# Patient Record
Sex: FEMALE | Race: WHITE | Employment: FULL TIME | URBAN - NONMETROPOLITAN AREA
[De-identification: names, ages, dates, MRNs, and addresses within clinical notes are randomized per-mention and may not be internally consistent; named-entity substitution may affect disease eponyms.]

---

## 2021-07-16 ENCOUNTER — HOSPITAL ENCOUNTER (EMERGENCY)
Age: 51
Discharge: HOME | End: 2021-07-16
Payer: COMMERCIAL

## 2021-07-16 VITALS
OXYGEN SATURATION: 98 % | WEIGHT: 164 LBS | TEMPERATURE: 97.5 F | SYSTOLIC BLOOD PRESSURE: 116 MMHG | DIASTOLIC BLOOD PRESSURE: 70 MMHG | BODY MASS INDEX: 28.6 KG/M2 | RESPIRATION RATE: 16 BRPM | HEART RATE: 73 BPM

## 2021-07-16 DIAGNOSIS — L03.115 CELLULITIS OF RIGHT FOOT: Primary | ICD-10-CM

## 2021-07-16 PROCEDURE — 99202 OFFICE O/P NEW SF 15 MIN: CPT | Performed by: NURSE PRACTITIONER

## 2021-07-16 PROCEDURE — 99203 OFFICE O/P NEW LOW 30 MIN: CPT

## 2021-07-16 RX ORDER — CEPHALEXIN 250 MG/1
250 CAPSULE ORAL 4 TIMES DAILY
Qty: 20 CAPSULE | Refills: 0 | Status: SHIPPED | OUTPATIENT
Start: 2021-07-16 | End: 2021-07-21

## 2021-07-16 RX ORDER — PANTOPRAZOLE SODIUM 40 MG/10ML
40 INJECTION, POWDER, LYOPHILIZED, FOR SOLUTION INTRAVENOUS DAILY
COMMUNITY

## 2021-07-16 ASSESSMENT — PAIN DESCRIPTION - LOCATION: LOCATION: FOOT

## 2021-07-16 ASSESSMENT — PAIN DESCRIPTION - ORIENTATION: ORIENTATION: RIGHT

## 2021-07-16 ASSESSMENT — ENCOUNTER SYMPTOMS
CHEST TIGHTNESS: 0
WHEEZING: 0
CHOKING: 0
COUGH: 0
APNEA: 0
SHORTNESS OF BREATH: 0
STRIDOR: 0
BACK PAIN: 0

## 2021-07-16 ASSESSMENT — PAIN SCALES - GENERAL: PAINLEVEL_OUTOF10: 3

## 2021-07-16 ASSESSMENT — PAIN DESCRIPTION - ONSET: ONSET: SUDDEN

## 2021-07-16 ASSESSMENT — PAIN DESCRIPTION - PAIN TYPE: TYPE: ACUTE PAIN

## 2021-07-16 ASSESSMENT — PAIN - FUNCTIONAL ASSESSMENT: PAIN_FUNCTIONAL_ASSESSMENT: PREVENTS OR INTERFERES SOME ACTIVE ACTIVITIES AND ADLS

## 2021-07-16 ASSESSMENT — PAIN DESCRIPTION - DESCRIPTORS: DESCRIPTORS: SHARP

## 2021-07-16 ASSESSMENT — PAIN DESCRIPTION - PROGRESSION: CLINICAL_PROGRESSION: GRADUALLY WORSENING

## 2021-07-16 ASSESSMENT — PAIN DESCRIPTION - FREQUENCY: FREQUENCY: CONTINUOUS

## 2021-07-16 NOTE — ED NOTES
Discharge instructions reviewed with patient. Patient informed to go to ER for worsening symptoms and to follow up with the Residency clinic. Contact info given. Patient ambulatory out in stable condition.       Pete Lawson RN  07/16/21 7253

## 2021-07-16 NOTE — ED TRIAGE NOTES
Pt to room 1 with c/o right heal pain that happened suddenly when she got out of bed this AM. She denies any injury but does states she pulled a piece of callus off of the bottom of her foot in that area a few days ago. She is wondering if it is cellulitis.

## 2021-07-16 NOTE — ED PROVIDER NOTES
[place] [id number]  Urgent Care Encounter      CHIEF COMPLAINT       Chief Complaint   Patient presents with    Foot Pain     right heal pain, no known injury       Nurses Notes reviewed and I agree except as noted in the HPI. HISTORY OFPRESENT ILLNESS   Ann Jaimes is a 45 y.o. The history is provided by the patient. No  was used. Foot Problem  Location:  Foot  Time since incident:  1 day  Injury: no    Foot location:  Sole of R foot  Pain details:     Quality:  Aching and shooting    Radiates to: into ankle area. Severity:  Severe    Onset quality:  Sudden    Duration:  12 hours    Timing:  Constant    Progression:  Unchanged  Chronicity:  New  Dislocation: no    Foreign body present:  No foreign bodies  Tetanus status:  Unknown  Prior injury to area:  No  Relieved by:  Nothing  Worsened by:  Bearing weight  Ineffective treatments:  None tried  Associated symptoms: stiffness    Associated symptoms: no back pain, no decreased ROM, no fatigue, no fever, no itching, no muscle weakness, no neck pain, no numbness, no swelling and no tingling    Risk factors: no concern for non-accidental trauma, no frequent fractures, no known bone disorder, no obesity and no recent illness    Risk factors comment:  Osteopenia      REVIEW OF SYSTEMS     Review of Systems   Constitutional: Negative for activity change, appetite change, chills, diaphoresis, fatigue, fever and unexpected weight change. Respiratory: Negative for apnea, cough, choking, chest tightness, shortness of breath, wheezing and stridor. Cardiovascular: Negative for chest pain, palpitations and leg swelling. Musculoskeletal: Positive for gait problem, myalgias and stiffness. Negative for back pain and neck pain. Skin: Positive for wound. Negative for itching.        PAST MEDICAL HISTORY         Diagnosis Date    Fibromyalgia     Osteopenia        SURGICAL HISTORY     Patient  has a past surgical history that includes Cholecystectomy and Hysterectomy. CURRENT MEDICATIONS       Previous Medications    CYMBALTA 60 MG CAPSULE    TAKE ONE CAPSULE BY MOUTH EVERY DAY    ESTROGENS, CONJUGATED, (PREMARIN) 0.9 MG TABLET    Take 1 tablet by mouth daily. LEVOTHYROXINE SODIUM 50 MCG CAPS    Take 50 mcg by mouth daily. PANTOPRAZOLE (PROTONIX) 40 MG INJECTION    Infuse 40 mg intravenously daily       ALLERGIES     Patient is is allergic to bactrim [sulfamethoxazole-trimethoprim] and pcn [penicillins]. FAMILY HISTORY     Patient's family history is not on file. SOCIAL HISTORY     Patient  reports that she has never smoked. She has never used smokeless tobacco. She reports that she does not drink alcohol and does not use drugs. PHYSICAL EXAM     ED TRIAGE VITALS  BP: 116/70, Temp: 97.5 °F (36.4 °C), Pulse: 73, Resp: 16, SpO2: 98 %  Physical Exam  Vitals and nursing note reviewed. Constitutional:       General: She is not in acute distress. Appearance: Normal appearance. She is not ill-appearing, toxic-appearing or diaphoretic. HENT:      Head: Normocephalic and atraumatic. Right Ear: External ear normal.      Left Ear: External ear normal.   Eyes:      Extraocular Movements: Extraocular movements intact. Conjunctiva/sclera: Conjunctivae normal.   Cardiovascular:      Pulses: Normal pulses. Pulmonary:      Effort: Pulmonary effort is normal.   Musculoskeletal:      Cervical back: Normal range of motion. Left foot: Decreased range of motion. Feet:    Feet:      Left foot:      Skin integrity: Erythema, warmth, dry skin and fissure present. No ulcer, blister, skin breakdown or callus. Comments: Augusta Rolon declined x-ray at facility today  Neurological:      General: No focal deficit present. Mental Status: She is alert and oriented to person, place, and time. Psychiatric:         Mood and Affect: Mood normal.         Behavior: Behavior normal.         Thought Content:  Thought content normal.         Judgment: Judgment normal.         DIAGNOSTIC RESULTS   Labs:No results found for this visit on 07/16/21. IMAGING:  No orders to display     URGENT CARE COURSE:     Vitals:    07/16/21 1732   BP: 116/70   Pulse: 73   Resp: 16   Temp: 97.5 °F (36.4 °C)   TempSrc: Temporal   SpO2: 98%   Weight: 164 lb (74.4 kg)       Medications - No data to display  PROCEDURES:  None  FINAL IMPRESSION      1. Cellulitis of right foot        DISPOSITION/PLAN   Decision To Discharge     The patient/Patient representative was advised to rest at home and elevate the affected area. Patient/representative was also advised to apply warm compresses to the affected area up to 15/20 minutes at a time up to 4 times a day. Patient/Patient representative is also advised to monitor any changes such as increasing redness, pain, development of fever or chills, generalized body aches. The patient will be given medication and is advised to take as directed. If the patient develops any changes such as fever not relieved with Motrin and Tylenol chest pain or shortness of breath patient is to dial 911 or go directly to the emergency room for reevaluation and further management of care. The patient does not experience any of these symptoms or concerns. Follow-up with her primary care provider in 2-3 days for reevaluation. The patient/Patient representatives are agreeable to treatment plan at this time and the patient left in stable condition.           PATIENT REFERRED TO:  . [address] [place] [id number]  Call today  [phone]    DISCHARGE MEDICATIONS:  New Prescriptions    CEPHALEXIN (KEFLEX) 250 MG CAPSULE    Take 1 capsule by mouth 4 times daily for 5 days     Current Discharge Medication List          MORALES Mcnair - MORALES Canales CNP  07/16/21 9431

## 2021-09-11 ENCOUNTER — APPOINTMENT (OUTPATIENT)
Dept: GENERAL RADIOLOGY | Age: 51
End: 2021-09-11
Payer: COMMERCIAL

## 2021-09-11 ENCOUNTER — HOSPITAL ENCOUNTER (EMERGENCY)
Age: 51
Discharge: HOME | End: 2021-09-11
Attending: EMERGENCY MEDICINE
Payer: COMMERCIAL

## 2021-09-11 VITALS
HEART RATE: 78 BPM | OXYGEN SATURATION: 93 % | DIASTOLIC BLOOD PRESSURE: 64 MMHG | SYSTOLIC BLOOD PRESSURE: 102 MMHG | TEMPERATURE: 97.8 F
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
## 2021-09-11 PROCEDURE — 2500000003 HC RX 250 WO HCPCS

## 2021-09-11 PROCEDURE — 99284 EMERGENCY DEPT VISIT MOD MDM: CPT

## 2021-09-11 PROCEDURE — 6370000000 HC RX 637 (ALT 250 FOR IP): Performed by: STUDENT IN AN ORGANIZED HEALTH CARE EDUCATION/TRAINING PROGRAM

## 2021-09-11 PROCEDURE — 13152 CMPLX RPR E/N/E/L 2.6-7.5 CM: CPT | Performed by: OTOLARYNGOLOGY

## 2021-09-11 PROCEDURE — 73130 X-RAY EXAM OF HAND: CPT

## 2021-09-11 PROCEDURE — 6370000000 HC RX 637 (ALT 250 FOR IP)

## 2021-09-11 PROCEDURE — 6370000000 HC RX 637 (ALT 250 FOR IP): Performed by: EMERGENCY MEDICINE

## 2021-09-11 RX ORDER — METRONIDAZOLE 500 MG/1
500 TABLET ORAL ONCE
Status: COMPLETED | OUTPATIENT
Start: 2021-09-11 | End: 2021-09-11

## 2021-09-11 RX ORDER — HYDROCODONE BITARTRATE AND ACETAMINOPHEN 5; 325 MG/1; MG/1
1 TABLET ORAL ONCE
Status: COMPLETED | OUTPATIENT
Start: 2021-09-11 | End: 2021-09-11

## 2021-09-11 RX ORDER — BACITRACIN, NEOMYCIN, POLYMYXIN B 400; 3.5; 5 [USP'U]/G; MG/G; [USP'U]/G
OINTMENT TOPICAL
Status: COMPLETED
Start: 2021-09-11 | End: 2021-09-11

## 2021-09-11 RX ORDER — DOXYCYCLINE 100 MG/1
100 TABLET ORAL 2 TIMES DAILY
Qty: 10 TABLET | Refills: 0 | Status: SHIPPED | OUTPATIENT
Start: 2021-09-11 | End: 2021-09-16

## 2021-09-11 RX ORDER — METRONIDAZOLE 500 MG/1
500 TABLET ORAL 2 TIMES DAILY
Qty: 10 TABLET | Refills: 0 | Status: SHIPPED | OUTPATIENT
Start: 2021-09-11 | End: 2021-09-16

## 2021-09-11 RX ORDER — GINSENG 100 MG
CAPSULE ORAL PRN
Status: DISCONTINUED | OUTPATIENT
Start: 2021-09-11 | End: 2021-09-12 | Stop reason: HOSPADM

## 2021-09-11 RX ORDER — LIDOCAINE HYDROCHLORIDE AND EPINEPHRINE 10; 10 MG/ML; UG/ML
INJECTION, SOLUTION INFILTRATION; PERINEURAL
Status: COMPLETED
Start: 2021-09-11 | End: 2021-09-11

## 2021-09-11 RX ORDER — METRONIDAZOLE 500 MG/1
500 TABLET ORAL 2 TIMES DAILY
Qty: 10 TABLET | Refills: 0 | Status: SHIPPED | OUTPATIENT
Start: 2021-09-11 | End: 2021-09-11 | Stop reason: SDUPTHER

## 2021-09-11 RX ORDER — DOXYCYCLINE 100 MG/1
100 TABLET ORAL 2 TIMES DAILY
Qty: 10 TABLET | Refills: 0 | Status: SHIPPED | OUTPATIENT
Start: 2021-09-11 | End: 2021-09-11 | Stop reason: SDUPTHER

## 2021-09-11 RX ORDER — DOXYCYCLINE HYCLATE 100 MG
100 TABLET ORAL ONCE
Status: COMPLETED | OUTPATIENT
Start: 2021-09-11 | End: 2021-09-11

## 2021-09-11 RX ADMIN — HYDROCODONE BITARTRATE AND ACETAMINOPHEN 1 TABLET: 5; 325 TABLET ORAL at 17:04

## 2021-09-11 RX ADMIN — LIDOCAINE HYDROCHLORIDE,EPINEPHRINE BITARTRATE: 10; .01 INJECTION, SOLUTION INFILTRATION; PERINEURAL at 22:15

## 2021-09-11 RX ADMIN — METRONIDAZOLE 500 MG: 500 TABLET ORAL at 22:11

## 2021-09-11 RX ADMIN — BACITRACIN ZINC, NEOMYCIN, POLYMYXIN B SULFAT: 5000; 3.5; 4 OINTMENT TOPICAL at 22:15

## 2021-09-11 RX ADMIN — DOXYCYCLINE HYCLATE 100 MG: 100 TABLET, COATED ORAL at 22:11

## 2021-09-11 ASSESSMENT — PAIN SCALES - GENERAL
PAINLEVEL_OUTOF10: 4
PAINLEVEL_OUTOF10: 7
PAINLEVEL_OUTOF10: 7
PAINLEVEL_OUTOF10: 4

## 2021-09-11 ASSESSMENT — ENCOUNTER SYMPTOMS
ABDOMINAL DISTENTION: 0
FACIAL SWELLING: 0
RECTAL PAIN: 0
NAUSEA: 0
TROUBLE SWALLOWING: 0
BACK PAIN: 0
ABDOMINAL PAIN: 0
CHOKING: 0
DIARRHEA: 0
SHORTNESS OF BREATH: 0
CHEST TIGHTNESS: 0
EYE ITCHING: 0
APNEA: 0
EYE DISCHARGE: 0
COUGH: 0
SORE THROAT: 0
COLOR CHANGE: 0
ANAL BLEEDING: 0
SINUS PAIN: 0
EYE REDNESS: 0
SINUS PRESSURE: 0
EYE PAIN: 0

## 2021-09-11 ASSESSMENT — PAIN DESCRIPTION - ORIENTATION: ORIENTATION: RIGHT

## 2021-09-11 ASSESSMENT — PAIN DESCRIPTION - PAIN TYPE
TYPE: ACUTE PAIN
TYPE: ACUTE PAIN

## 2021-09-11 ASSESSMENT — PAIN DESCRIPTION - LOCATION: LOCATION: HAND

## 2021-09-11 NOTE — ED NOTES
Pt resting in bed on phone. No distress noted at this time. Patient given towel for bleeding. , will continue to monitor.       Anderson Ramirez RN  09/11/21 1800

## 2021-09-11 NOTE — ED TRIAGE NOTES
Pt to ED via intake with family with c/o dog bite to their left ear and right thumb. Pt reports they were [facility] with a dog [facility] the animal shelter when a dog attacked them.  Pt VSS

## 2021-09-11 NOTE — ED PROVIDER NOTES
[facility] ENCOUNTER          Pt Name: Alicia Tobar  MRN: [id number]  [place] 1970  Date of evaluation: 9/11/2021  Treating Resident Physician: Conor Crockett MD  Supervising Physician: Monroe Monae DO    CHIEF COMPLAINT       Chief Complaint   Patient presents with    Animal Bite     History obtained from the patient. HISTORY OF PRESENT ILLNESS    HPI  Alicia Tobar is a 45 y.o. female who presents to the emergency department for evaluation of dog bite. Patient states that today she was playing at the animal shelter because she was looking for a dog to rescue. She was playing with a boxer/terrier mix for about 10 minutes until she got up to leave and suddenly the dog attacked her. She states that the dog came at her approximately 3-4 times and bit her in the right hand as well as left ear. Patient states that during the episode she felt very lightheaded, and was in a lot of pain. There are 4 puncture wounds in her hand, one puncture wound is in the hypothenar eminence and is most prominent, other puncture wounds are more superficial. There is also a puncture wound on her right thumb. Her left ear there is a 7 cm through and through laceration with cartilage exposure. The wound is linear and lines up pretty well. There is also a small 2 cm laceration on the ear. Patient does not have any risk factors for poor wound healing. Patient has pain mainly in her right hand that she describes as constant, throbbing 7 out of 10. Patient states that she is up-to-date on her tetanus vaccinations, the last one was 3 years ago. Patient also states that the dog at the shelter is up-to-date on his vaccinations as well and has had recent rabies vaccine. The patient has no other acute complaints at this time.      REVIEW OF SYSTEMS   Review of Systems   Constitutional: Negative for activity change, appetite change, diaphoresis and fatigue. HENT: Negative for ear pain, facial swelling, sinus pressure, sinus pain, sore throat and trouble swallowing. Eyes: Negative for pain, discharge, redness and itching. Respiratory: Negative for apnea, cough, choking, chest tightness and shortness of breath. Cardiovascular: Negative for chest pain, palpitations and leg swelling. Gastrointestinal: Negative for abdominal distention, abdominal pain, anal bleeding, diarrhea, nausea and rectal pain. Endocrine: Negative for polydipsia, polyphagia and polyuria. Genitourinary: Negative for dysuria, flank pain, genital sores and hematuria. Musculoskeletal: Negative for arthralgias, back pain, joint swelling, myalgias, neck pain and neck stiffness. Skin: Positive for wound. Negative for color change and rash. Neurological: Positive for light-headedness. Negative for syncope, facial asymmetry, speech difficulty and headaches. Hematological: Negative for adenopathy. Psychiatric/Behavioral: Negative for agitation, behavioral problems, hallucinations, self-injury and suicidal ideas. PAST MEDICAL AND SURGICAL HISTORY     Past Medical History:   Diagnosis Date    Fibromyalgia     Osteopenia      Past Surgical History:   Procedure Laterality Date    CHOLECYSTECTOMY      HYSTERECTOMY         MEDICATIONS   No current facility-administered medications for this encounter. Current Outpatient Medications:     metroNIDAZOLE (FLAGYL) 500 MG tablet, Take 1 tablet by mouth 2 times daily for 5 days, Disp: 10 tablet, Rfl: 0    doxycycline monohydrate (ADOXA) 100 MG tablet, Take 1 tablet by mouth 2 times daily for 5 days, Disp: 10 tablet, Rfl: 0    pantoprazole (PROTONIX) 40 MG injection, Infuse 40 mg intravenously daily, Disp: , Rfl:     CYMBALTA 60 MG capsule, TAKE ONE CAPSULE BY MOUTH EVERY DAY, Disp: 30 capsule, Rfl: 5    Levothyroxine Sodium 50 MCG CAPS, Take 50 mcg by mouth daily. , Disp: 30 capsule, Rfl: 11    estrogens, conjugated, (PREMARIN) 0.9 MG tablet, Take 1 tablet by mouth daily. , Disp: 90 tablet, Rfl: 3      SOCIAL HISTORY     Social History     Social History Narrative    Not on file     Social History     Tobacco Use    Smoking status: Never Smoker    Smokeless tobacco: Never Used   Vaping Use    Vaping Use: Never used   Substance Use Topics    Alcohol use: No    Drug use: No       ALLERGIES     Allergies   Allergen Reactions    Bactrim [Sulfamethoxazole-Trimethoprim] Other (See Comments)     Fever, vomiting, neurolgia    Pcn [Penicillins] Rash       FAMILY HISTORY   No family history on file. PREVIOUS RECORDS   Previous records reviewed: Patient was previously seen in the ED on 7/16/2021 due to cellulitis. Sunday Rooney PHYSICAL EXAM     Initial vital signs and nursing assessment reviewed and normal. There is no height or weight on file to calculate BMI. Pulsoximetry is normal per my interpretation. Additional Vital Signs:  Vitals:    09/11/21 1755   BP: 102/64   Pulse:    Temp:    SpO2:        Physical Exam  Constitutional:       General: She is not in acute distress. Appearance: Normal appearance. She is not ill-appearing or diaphoretic. HENT:      Head: Normocephalic and atraumatic. Right Ear: Hearing, tympanic membrane, ear canal and external ear normal.      Left Ear: Hearing normal.      Ears:        Comments: Through and through linear laceration of the left ear with exposed cartilage. Picture added. Nose: Nose normal. No congestion or rhinorrhea. Mouth/Throat:      Mouth: Mucous membranes are moist.      Pharynx: No oropharyngeal exudate or posterior oropharyngeal erythema. Eyes:      General: No scleral icterus. Extraocular Movements: Extraocular movements intact. Conjunctiva/sclera: Conjunctivae normal.      Pupils: Pupils are equal, round, and reactive to light. Cardiovascular:      Rate and Rhythm: Normal rate and regular rhythm. Pulses: Normal pulses.       Heart sounds: Normal heart sounds. No murmur heard. No friction rub. No gallop. Pulmonary:      Effort: Pulmonary effort is normal. No respiratory distress. Breath sounds: Normal breath sounds. Chest:      Chest wall: No tenderness. Abdominal:      General: Bowel sounds are normal. There is no distension. Palpations: Abdomen is soft. Tenderness: There is no abdominal tenderness. There is no guarding or rebound. Musculoskeletal:         General: No swelling or tenderness. Normal range of motion. Right hand: Swelling and laceration present. Cervical back: Normal range of motion and neck supple. No rigidity or tenderness. Comments: Patient's right hand has laceration on the thenar eminence as well as the thumb. 3 small superficial puncture wounds also noted around the area. Patient's hand is swollen around the thumb, inability to move thumb without pain. Can move all other fingers. Skin:     General: Skin is warm and dry. Capillary Refill: Capillary refill takes less than 2 seconds. Coloration: Skin is not jaundiced or pale. Findings: No bruising, erythema, lesion or rash. Neurological:      General: No focal deficit present. Mental Status: She is alert and oriented to person, place, and time. Cranial Nerves: No cranial nerve deficit. Motor: No weakness. Gait: Gait normal.   Psychiatric:         Mood and Affect: Mood normal.         Behavior: Behavior normal.       Media Information     Document Information    Wound      09/11/2021 16:59   Attached To: Hospital Encounter on 9/11/21   Source Information    [facility]     Media Information     Document Information    Wound      09/11/2021 16:59   Attached To: Hospital Encounter on 9/11/21   Source Information    [facility]       MEDICAL DECISION MAKING   Initial Assessment:   1.  Dog bite     DDX: laceration, infection    Plan:    Xray   Wound irrigation   Pain management   Contact plastics for advise on closure vs. Open     No fracture in hand  ENT repaired ear lac with f/u. Leaving thenar wound open, slightly dressed. Advised patient on wound care. D/c with oral abx and f/u    ED RESULTS   Laboratory results:  Labs Reviewed - No data to display    Radiologic studies results:  XR HAND RIGHT (MIN 3 VIEWS)   Final Result      No fracture, dislocation or radiopaque foreign body. Final report electronically signed by Dr. Melanie Ambrose on 9/11/2021 4:14 PM          ED Medications administered this visit:   Medications   HYDROcodone-acetaminophen (NORCO) 5-325 MG per tablet 1 tablet (1 tablet Oral Given 9/11/21 1704)   lidocaine-EPINEPHrine 1 %-1:850455 injection (  Given 9/11/21 2215)   metroNIDAZOLE (FLAGYL) tablet 500 mg (500 mg Oral Given 9/11/21 2211)   doxycycline hyclate (VIBRA-TABS) tablet 100 mg (100 mg Oral Given 9/11/21 2211)   neomycin-bacitracin-polymyxin (NEOSPORIN) 400-5-5000 ointment (  Given 9/11/21 2215)         ED COURSE     ED Course as of Sep 12 0105   Sat Sep 11, 2021   1719 X-ray shows no acute fractures. Contacting Dr. Laney Talamantes from plastics to consult regarding wound closure. [EL]   [id number] [facility] Dr. Laney Talamantes not on call, contacted ENT. [EL]   [id number] ENT states that since this is a through and through lac will need plastics on board. Advises that we transfer patient to [facility].    [EL]   [place] Dr. Judd Dice from ENT for second opinion. He is performing Lac repair at bedside. [EL]      ED Course User Index  [EL] Valerie Palma MD       Strict return precautions and follow up instructions were discussed with the patient prior to discharge, with which the patient agrees.       MEDICATION CHANGES     Discharge Medication List as of 9/11/2021 10:19 PM      START taking these medications    Details   doxycycline monohydrate (ADOXA) 100 MG tablet Take 1 tablet by mouth 2 times daily for 5 days, Disp-10 tablet, R-0Normal metroNIDAZOLE (FLAGYL) 500 MG tablet Take 1 tablet by mouth 2 times daily for 5 days, Disp-10 tablet, R-0Normal               FINAL DISPOSITION     Final diagnoses:   Dog bite, initial encounter     Condition: condition: stable  Dispo: Discharge to home      This transcription was electronically signed. Parts of this transcriptions may have been dictated by use of voice recognition software and electronically transcribed, and parts may have been transcribed with the assistance of an ED scribe. The transcription may contain errors not detected in proofreading. Please refer to my supervising physician's documentation if my documentation differs.     Electronically Signed: Christiano Duran MD, 09/12/21, 1:05 AM         Lloyd Lacy MD  Resident  09/12/21 8125

## 2021-09-12 NOTE — ED NOTES
Pt resting in bed with no complaints at this time. Call light within reach at this time, respirations unlabored and easy.       Winnie Gan RN  09/11/21 3797

## 2021-09-12 NOTE — CONSULTS
Department of Otolaryngology  Procedure Note    Reason for Consult:  Ear laceration  Requesting Physician:  Jigar Morales     CHIEF COMPLAINT:  Ear laceration    History Obtained From:  patient    HISTORY OF PRESENT ILLNESS:                The patient is a 45 y.o. female who [place] [facility] [place] [facility] [place] a left ear laceration [place]. The dog bit her left ear and hand. She did not lose consciousness. VITALS:  /64   Pulse 78   Temp 97.8 °F (36.6 °C)   SpO2 93%       Procedure performed: Repair of complex ear laceration, 6 cm  Attending: Eveline Babin MD  Findings: Left ear: 6 cm laceration in the antihelical fold, through and through the cartilage and posterior skin. The laceration is fairly linear anteriorly, with some communition of the posterior skin. There is a small skin flap anterior to the tragus. Procedure: The patient was laid down in the stretcher. Lidocaine 1% with 1 100,000 epinephrine was injected in a ring block. 9 cc were used. The area was then thoroughly scrubbed with Betadine. I then irrigated with approximately 100 cc of saline. At this point the patient was draped with sterile towels. I examined the wound. I then reapproximated the cartilage using 4-0 PDS. At this point a 4-0 Monocryl suture was used in a deep dermal fashion to approximate the soft tissue of the ear. I then used a 5-0 fast gut to perform a superficial running closure of the skin along the anterior surface. I then inspected the posterior surface. I placed some more PDS sutures to reapproximate the cartilage. I trimmed away a small piece of skin that was bridging the laceration. I then closed in a deep dermal layer using the Monocryl and a superficial layer using the fast gut. I then addressed the small laceration anterior to the tragus. 3 fast gut sutures were thrown in a running fashion. The area was then again washed with saline.

## 2021-09-13 ENCOUNTER — TELEPHONE (OUTPATIENT)
Dept: ENT CLINIC | Age: 51
End: 2021-09-13

## 2021-09-13 NOTE — TELEPHONE ENCOUNTER
Patient was seen in the ER over the weekend for an ear laceration that was managed by Dr. Davina Escalante. He requested the patient follow-up on 9/16/2021 for wound recheck.   Please call the patient to schedule follow-up on 9/16/2021 9:45 AM.

## 2021-09-16 ENCOUNTER — OFFICE VISIT (OUTPATIENT)
Dept: ENT CLINIC | Age: 51
End: 2021-09-16

## 2021-09-16 VITALS
HEIGHT: 64 IN | HEART RATE: 68 BPM | TEMPERATURE: 96.7 F | RESPIRATION RATE: 14 BRPM | WEIGHT: 165.1 LBS | BODY MASS INDEX: 28.19 KG/M2 | DIASTOLIC BLOOD PRESSURE: 60 MMHG | OXYGEN SATURATION: 98 % | SYSTOLIC BLOOD PRESSURE: 106 MMHG

## 2021-09-16 DIAGNOSIS — S01.312D LACERATION OF LEFT EARLOBE, SUBSEQUENT ENCOUNTER: Primary | ICD-10-CM

## 2021-09-16 PROCEDURE — 99024 POSTOP FOLLOW-UP VISIT: CPT | Performed by: OTOLARYNGOLOGY

## 2021-09-16 RX ORDER — IBUPROFEN 200 MG
200 TABLET ORAL EVERY 6 HOURS PRN
COMMUNITY

## 2021-09-16 NOTE — PROGRESS NOTES
[facility]    HPI   It was a pleasure to see Kel Pickett, a 45 y.o. female, who returns today for her left ear laceration. I repaired the laceration on Saturday in the ED. She has done well since. There is a little pain, mainly at the tragus. The review of systems, past medical, past surgical, social, and family history were updated in the medical chart and reviewed today. No significant changes were noted. Objective     Vitals:    09/16/21 0937   BP: 106/60   Pulse: 68   Resp: 14   Temp: 96.7 °F (35.9 °C)   SpO2: 98%       ABNORMAL OTOLARYNGOLOGIC EXAM FINDINGS:   Wound healing well. No e/o infection. Sutures in place. Assessment     Visit Diagnoses       Codes    Laceration of left earlobe, subsequent encounter    -  Primary [address] is a 45 y.o. female with: a dog bite to left ear, s/p repair in ED on Saturday. Healing well. Continue abx as prescribed. RTC PRN. Thank you for involving me in this patient's care. Please do not hesitate to call with any questions or concerns. Sincerely,    Michael Stout M.D. Otolaryngology-Head and Neck Surgery    **This report has been created using voice recognition software. It may contain minor errors which are inherent in voice recognition technology. **

## 2021-10-26 ENCOUNTER — TELEPHONE (OUTPATIENT)
Dept: ENT CLINIC | Age: 51
End: 2021-10-26

## 2021-10-26 NOTE — TELEPHONE ENCOUNTER
Patient called and stated that she had seen Dr. Monica Zuniga in Er for c/o dog bite to the left ear and right thumb in September and  then Followed up with him on 9/16/2021. Patient states ear got bumped couple days after F/U and then bruised. Patient stated that bruise went away but now has a hard knot and it's extremely painful to touch. There is  no warmth or drainage; but seems a little swollen. Patient would like to know if and when she should be seen. Please advise.

## 2021-10-26 NOTE — TELEPHONE ENCOUNTER
The knot has been there for a few weeks then? If it has been like this for a couple of weeks, I would say she can be scheduled 11/2/21 at 3:45 with Dr. Angela Carney.  She should go to the ER if it worsens in the meantime

## 2021-11-02 ENCOUNTER — OFFICE VISIT (OUTPATIENT)
Dept: ENT CLINIC | Age: 51
End: 2021-11-02
Payer: COMMERCIAL

## 2021-11-02 VITALS
HEART RATE: 73 BPM | SYSTOLIC BLOOD PRESSURE: 110 MMHG | TEMPERATURE: 97.6 F | WEIGHT: 168.4 LBS | DIASTOLIC BLOOD PRESSURE: 82 MMHG | BODY MASS INDEX: 28.91 KG/M2 | RESPIRATION RATE: 16 BRPM | OXYGEN SATURATION: 96 %

## 2021-11-02 DIAGNOSIS — H61.032 CHONDRITIS OF AURICLE, LEFT: Primary | ICD-10-CM

## 2021-11-02 PROCEDURE — 99213 OFFICE O/P EST LOW 20 MIN: CPT | Performed by: OTOLARYNGOLOGY

## 2021-11-02 RX ORDER — METHYLPREDNISOLONE 4 MG/1
TABLET ORAL
Qty: 1 KIT | Refills: 0 | Status: SHIPPED | OUTPATIENT
Start: 2021-11-02

## 2021-11-02 NOTE — PROGRESS NOTES
[facility]    HPI   It was a pleasure to see Emiliana Baltazar, a 45 y.o. female, who returns today for her left ear. I repaired a laceration from a dog bite on September 11. She has healed up well from that. She was hit in the ear by a different dog about a month ago. She developed a bruise at that spot, and it was very tender. She was hoping the tenderness would go away, but it remains tender. She wanted me to take another look at it today. The review of systems, past medical, past surgical, social, and family history were updated in the medical chart and reviewed today. No significant changes were noted. Objective     Vitals:    11/02/21 1553   BP: 110/82   Pulse: 73   Resp: 16   Temp: 97.6 °F (36.4 °C)   SpO2: 96%       PHYSICAL EXAM FINDINGS:  Left ear laceration well-healed. On along the mid helix, there is some firm swelling and mild ecchymosis. She is tender to palpation, especially posteriorly. There is no sign of infection. Data: The relevant prior clinic notes were reviewed today, including the referring physicians notes. Imaging: None          Assessment      Diagnosis Orders   1. Chondritis of auricle, left             Plan   Emiliana Baltazar is a 45 y.o. female with: Chondritis of her left ear after trauma. I prescribed a Medrol Dosepak to decrease the inflammation. I do not see any signs of infection. She is currently on doxycycline and Flagyl for a bone infection of her right thumb, also from the dog bite incident. If the inflammation does not improve with the steroids, I will consider a fluoroquinolone. Thank you for involving me in this patient's care. Please do not hesitate to call with any questions or concerns. Sincerely,    Robin Liu M.D. Otolaryngology-Head and Neck Surgery    **This report has been created using voice recognition software.  It may contain minor errors which are inherent in voice recognition

## 2021-11-10 ENCOUNTER — APPOINTMENT (OUTPATIENT)
Dept: GENERAL RADIOLOGY | Age: 51
End: 2021-11-10
Payer: COMMERCIAL

## 2021-11-10 ENCOUNTER — HOSPITAL ENCOUNTER (EMERGENCY)
Age: 51
Discharge: HOME | End: 2021-11-10
Attending: EMERGENCY MEDICINE
Payer: COMMERCIAL

## 2021-11-10 VITALS
BODY MASS INDEX: 27.83 KG/M2 | HEIGHT: 64 IN | TEMPERATURE: 98 F | WEIGHT: 163 LBS | OXYGEN SATURATION: 98 % | HEART RATE: 111 BPM | SYSTOLIC BLOOD PRESSURE: 142 MMHG | DIASTOLIC BLOOD PRESSURE: 79 MMHG | RESPIRATION RATE: 18 BRPM
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
## 2021-11-10 PROCEDURE — 73610 X-RAY EXAM OF ANKLE: CPT

## 2021-11-10 PROCEDURE — 99284 EMERGENCY DEPT VISIT MOD MDM: CPT

## 2021-11-10 PROCEDURE — 73110 X-RAY EXAM OF WRIST: CPT

## 2021-11-10 PROCEDURE — 6370000000 HC RX 637 (ALT 250 FOR IP): Performed by: STUDENT IN AN ORGANIZED HEALTH CARE EDUCATION/TRAINING PROGRAM

## 2021-11-10 PROCEDURE — 12001 RPR S/N/AX/GEN/TRNK 2.5CM/<: CPT

## 2021-11-10 RX ORDER — CLINDAMYCIN HYDROCHLORIDE 150 MG/1
450 CAPSULE ORAL ONCE
Status: COMPLETED | OUTPATIENT
Start: 2021-11-10 | End: 2021-11-10

## 2021-11-10 RX ORDER — LIDOCAINE HYDROCHLORIDE AND EPINEPHRINE 10; 10 MG/ML; UG/ML
20 INJECTION, SOLUTION INFILTRATION; PERINEURAL ONCE
Status: DISCONTINUED | OUTPATIENT
Start: 2021-11-10 | End: 2021-11-10 | Stop reason: HOSPADM

## 2021-11-10 RX ORDER — CLINDAMYCIN HYDROCHLORIDE 150 MG/1
450 CAPSULE ORAL 3 TIMES DAILY
Qty: 63 CAPSULE | Refills: 0 | Status: SHIPPED | OUTPATIENT
Start: 2021-11-10 | End: 2021-11-17

## 2021-11-10 RX ORDER — DOXYCYCLINE HYCLATE 100 MG
100 TABLET ORAL 2 TIMES DAILY
Qty: 14 TABLET | Refills: 0 | Status: SHIPPED | OUTPATIENT
Start: 2021-11-10 | End: 2021-11-17

## 2021-11-10 RX ORDER — DOXYCYCLINE HYCLATE 100 MG
100 TABLET ORAL ONCE
Status: COMPLETED | OUTPATIENT
Start: 2021-11-10 | End: 2021-11-10

## 2021-11-10 RX ADMIN — CLINDAMYCIN HYDROCHLORIDE 450 MG: 150 CAPSULE ORAL at 14:48

## 2021-11-10 RX ADMIN — DOXYCYCLINE HYCLATE 100 MG: 100 TABLET, COATED ORAL at 14:48

## 2021-11-10 ASSESSMENT — ENCOUNTER SYMPTOMS
VOMITING: 0
SHORTNESS OF BREATH: 0
CONSTIPATION: 0
RHINORRHEA: 0
BLOOD IN STOOL: 0
SORE THROAT: 0
DIARRHEA: 0
NAUSEA: 0
ABDOMINAL PAIN: 0
CHEST TIGHTNESS: 0
ANAL BLEEDING: 0

## 2021-11-10 ASSESSMENT — PAIN SCALES - GENERAL: PAINLEVEL_OUTOF10: 4

## 2021-11-10 NOTE — ED NOTES
Pt to er. Pt states she walked in her door and her pitbull attacked her, biting her bilateral lower arms and bilateral ankles. Pt has wounds wrapped with guaze. Bleeding controlled. Pt states tetnus is Up to date and that her pitbulls shots are up to date. Assessment completed. Resp regular. Family at side. Call light in reach.       Madelin Rooney RN  11/10/21 1148

## 2021-11-10 NOTE — ED PROVIDER NOTES
[facility] ENCOUNTER          Pt Name: Linda Monzon  MRN: [id number]  [place] 1970  Date of evaluation: 11/10/2021  Treating Resident Physician: Jeet Pulliam MD  Supervising Physician: Miriam Leone       Chief Complaint   Patient presents with    Animal Bite           HISTORY OF PRESENT ILLNESS    The history is provided by the patient. Linda Monzon is a 45 y.o. female who presents to the emergency department for evaluation of multiple dog bites to both of her wrists and ankles. The dog is her pet pit bull, who she reports has never been aggressive towards her in the past. The dog is up to date on vaccinations. Ms Quinton Crowley said that when she arrived home, she opened the door and the dog latched onto her ankle, while she was attempting to grab the dogs collar, it continued to bite her on the other ankle and both wrists. Tetanus up-to-date. The patient has no other acute complaints at this time. REVIEW OF SYSTEMS   Review of Systems   HENT: Negative for rhinorrhea and sore throat. Respiratory: Negative for chest tightness and shortness of breath. Cardiovascular: Negative for chest pain. Gastrointestinal: Negative for abdominal pain, anal bleeding, blood in stool, constipation, diarrhea, nausea and vomiting. Genitourinary: Negative for dysuria, frequency and urgency. Musculoskeletal: Negative for joint swelling and myalgias. Skin: Positive for wound. Negative for rash. PAST MEDICAL AND SURGICAL HISTORY     Past Medical History:   Diagnosis Date    Fibromyalgia     Hypothyroidism     Osteopenia      Past Surgical History:   Procedure Laterality Date    CHOLECYSTECTOMY      HYSTERECTOMY           MEDICATIONS   No current facility-administered medications for this encounter.     Current Outpatient Medications:     clindamycin (CLEOCIN) 150 MG capsule, Take 3 capsules by mouth 3 times daily for 7 days, Disp: 63 capsule, Rfl: 0    doxycycline hyclate (VIBRA-TABS) 100 MG tablet, Take 1 tablet by mouth 2 times daily for 7 days, Disp: 14 tablet, Rfl: 0    methylPREDNISolone (MEDROL, JHONATHAN,) 4 MG tablet, By mouth., Disp: 1 kit, Rfl: 0    ibuprofen (ADVIL;MOTRIN) 200 MG tablet, Take 200 mg by mouth every 6 hours as needed for Pain, Disp: , Rfl:     pantoprazole (PROTONIX) 40 MG injection, Infuse 40 mg intravenously daily, Disp: , Rfl:     CYMBALTA 60 MG capsule, TAKE ONE CAPSULE BY MOUTH EVERY DAY, Disp: 30 capsule, Rfl: 5    Levothyroxine Sodium 50 MCG CAPS, Take 50 mcg by mouth daily. , Disp: 30 capsule, Rfl: 11    estrogens, conjugated, (PREMARIN) 0.9 MG tablet, Take 1 tablet by mouth daily. , Disp: 90 tablet, Rfl: 3      SOCIAL HISTORY     Social History     Social History Narrative    Not on file     Social History     Tobacco Use    Smoking status: Never Smoker    Smokeless tobacco: Never Used   Vaping Use    Vaping Use: Never used   Substance Use Topics    Alcohol use: No    Drug use: No         ALLERGIES     Allergies   Allergen Reactions    Bactrim [Sulfamethoxazole-Trimethoprim] Other (See Comments)     Fever, vomiting, neurolgia    Pcn [Penicillins] Rash         FAMILY HISTORY   History reviewed. No pertinent family history. PREVIOUS RECORDS   Previous records reviewed: Medical, past surgical, medications, allergies        PHYSICAL EXAM     ED Triage Vitals [11/10/21 1400]   BP Temp Temp Source Pulse Resp SpO2 Height Weight   (!) 142/79 98 °F (36.7 °C) Oral 111 18 98 % 5' 4\" (1.626 m) 163 lb (73.9 kg)     Initial vital signs and nursing assessment reviewed and normal. Body mass index is 27.98 kg/m². Pulsoximetry is normal per my interpretation. Additional Vital Signs:  Vitals:    11/10/21 1400   BP: (!) 142/79   Pulse: 111   Resp: 18   Temp: 98 °F (36.7 °C)   SpO2: 98%       Physical Exam  Constitutional:       Appearance: Normal appearance. She is not ill-appearing. PM  Performed by: Benjamin Blanton MD  Authorized by: Henry Lozada DO     Consent:     Consent obtained:  Verbal    Consent given by:  Patient    Risks discussed:  Infection, poor cosmetic result, pain, retained foreign body, need for additional repair, poor wound healing, vascular damage, tendon damage and nerve damage    Alternatives discussed:  No treatment  Anesthesia (see MAR for exact dosages): Anesthesia method:  Local infiltration    Local anesthetic:  Lidocaine 1% WITH epi  Repair type:     Repair type:  Simple  Exploration:     Hemostasis achieved with:  Direct pressure    Wound exploration: wound explored through full range of motion and entire depth of wound probed and visualized      Wound extent: no fascia violation noted, no foreign bodies/material noted and no underlying fracture noted    Treatment:     Area cleansed with:  Soap and water    Irrigation solution:  Tap water  Skin repair:     Repair method:  Sutures and Steri-Strips    Suture size:  3-0    Suture material:  Nylon  Comments: Total of 7 puncture wounds on the right forearm. 2 required 1 stitch each, additional 3 were Steri-Stripped. All were cleaned and bandaged. Total of 2 wounds on left forearm, both were cleaned, Steri-Stripped, bandaged. 2 wounds on the right ankle. One was small cleaned bandaged, other was 2 cm long, cleaned, required 2 sutures loosely approximated, bandaged. 1 wound to the left ankle, subcentimeter, did not require suturing or Steri-Strips, cleaned, bandage. MEDICAL DECISION MAKING   Initial Assessment:   1. Is a 54-year-old female, up-to-date on tetanus, presenting with unprovoked dog bite of her own vaccinated dog. Physical exam significant for multiple puncture wounds to right lateral wrist and bilateral ankles. Plan:    Patient allergic to swollen, given Clinda and Doxy   All x-rays negative.  All wounds were thoroughly irrigated.    2 puncture wounds on the right forearm required 1 suture each   1 puncture wound on right ankle required 2 sutures   All wounds are bandaged.  Discharge to home with strict return precautions, follow-up, antibiotics. ED RESULTS   Laboratory results:  Labs Reviewed - No data to display    Radiologic studies results:  XR WRIST RIGHT (MIN 3 VIEWS)   Final Result    No evidence of acute osseous injury or radiopaque foreign body in both wrists. **This report has been created using voice recognition software. It may contain minor errors which are inherent in voice recognition technology. **      Final report electronically signed by Dr. Li Guo MD on 11/10/2021 2:35 PM      XR ANKLE RIGHT (MIN 3 VIEWS)   Final Result   No evidence of acute osseous injury or definite radiopaque foreign body. There are focal calcifications in the subcutaneous tissues anterior to the distal tibia on the right. **This report has been created using voice recognition software. It may contain minor errors which are inherent in voice recognition technology. **      Final report electronically signed by Dr. Li Guo MD on 11/10/2021 2:39 PM      XR WRIST LEFT (MIN 3 VIEWS)   Final Result    No evidence of acute osseous injury or radiopaque foreign body in both wrists. **This report has been created using voice recognition software. It may contain minor errors which are inherent in voice recognition technology. **      Final report electronically signed by Dr. Li Guo MD on 11/10/2021 2:35 PM      XR ANKLE LEFT (MIN 3 VIEWS)   Final Result   No evidence of acute osseous injury or definite radiopaque foreign body. There are focal calcifications in the subcutaneous tissues anterior to the distal tibia on the right. **This report has been created using voice recognition software. It may contain minor errors which are inherent in voice recognition technology. **      Final report electronically signed by Dr. Adrian Edmond MD on 11/10/2021 2:39 PM          ED Medications administered this visit:   Medications   doxycycline hyclate (VIBRA-TABS) tablet 100 mg (100 mg Oral Given 11/10/21 1448)   clindamycin (CLEOCIN) capsule 450 mg (450 mg Oral Given 11/10/21 1448)         ED COURSE         Strict return precautions and follow up instructions were discussed with the patient prior to discharge, with which the patient agrees. MEDICATION CHANGES     Discharge Medication List as of 11/10/2021  4:06 PM      START taking these medications    Details   clindamycin (CLEOCIN) 150 MG capsule Take 3 capsules by mouth 3 times daily for 7 days, Disp-63 capsule, R-0Normal      doxycycline hyclate (VIBRA-TABS) 100 MG tablet Take 1 tablet by mouth 2 times daily for 7 days, Disp-14 tablet, R-0Normal               FINAL DISPOSITION     Final diagnoses:   Dog bite, initial encounter     Condition: condition: good  Dispo: Discharge to home      This transcription was electronically signed. Parts of this transcriptions may have been dictated by use of voice recognition software and electronically transcribed, and parts may have been transcribed with the assistance of an ED scribe. The transcription may contain errors not detected in proofreading. Please refer to my supervising physician's documentation if my documentation differs.     Electronically Signed: Holden Talamantes MD, 11/10/21, 8:35 PM          Holden Talamantes MD  Resident  11/10/21 6104

## 2021-12-15 ENCOUNTER — HOSPITAL ENCOUNTER (OUTPATIENT)
Age: 51
Discharge: HOME | End: 2021-12-15
Payer: COMMERCIAL

## 2021-12-15 LAB
ALBUMIN SERPL-MCNC: 4.8 G/DL (ref 3.5–5.1)
ALP BLD-CCNC: 97 U/L (ref 38–126)
ALT SERPL-CCNC: 31 U/L (ref 11–66)
ANION GAP SERPL CALCULATED.3IONS-SCNC: 13 MEQ/L (ref 8–16)
AST SERPL-CCNC: 20 U/L (ref 5–40)
BASOPHILS # BLD: 0.7 %
BASOPHILS ABSOLUTE: 0.1 THOU/MM3 (ref 0–0.1)
BILIRUB SERPL-MCNC: < 0.2 MG/DL (ref 0.3–1.2)
BUN BLDV-MCNC: 17 MG/DL (ref 7–22)
CALCIUM SERPL-MCNC: 9.8 MG/DL (ref 8.5–10.5)
CHLORIDE BLD-SCNC: 103 MEQ/L (ref 98–111)
CHOLESTEROL, TOTAL: 245 MG/DL (ref 100–199)
CO2: 26 MEQ/L (ref 23–33)
CREAT SERPL-MCNC: 0.9 MG/DL (ref 0.4–1.2)
EOSINOPHIL # BLD: 3.8 %
EOSINOPHILS ABSOLUTE: 0.3 THOU/MM3 (ref 0–0.4)
ERYTHROCYTE [DISTWIDTH] IN BLOOD BY AUTOMATED COUNT: 12.5 % (ref 11.5–14.5)
ERYTHROCYTE [DISTWIDTH] IN BLOOD BY AUTOMATED COUNT: 41.1 FL (ref 35–45)
FERRITIN: 69 NG/ML (ref 10–291)
GFR SERPL CREATININE-BSD FRML MDRD: 66 ML/MIN/1.73M2
GLUCOSE BLD-MCNC: 98 MG/DL (ref 70–108)
HCT VFR BLD CALC: 43.3 % (ref 37–47)
HDLC SERPL-MCNC: 54 MG/DL
HEMOGLOBIN: 13.6 GM/DL (ref 12–16)
IMMATURE GRANS (ABS): 0.03 THOU/MM3 (ref 0–0.07)
IMMATURE GRANULOCYTES: 0.4 %
IRON: 37 UG/DL (ref 50–170)
LDL CHOLESTEROL CALCULATED: 162 MG/DL
LYMPHOCYTES # BLD: 33.1 %
LYMPHOCYTES ABSOLUTE: 2.4 THOU/MM3 (ref 1–4.8)
MCH RBC QN AUTO: 28.4 PG (ref 26–33)
MCHC RBC AUTO-ENTMCNC: 31.4 GM/DL (ref 32.2–35.5)
MCV RBC AUTO: 90.4 FL (ref 81–99)
MONOCYTES # BLD: 8.5 %
MONOCYTES ABSOLUTE: 0.6 THOU/MM3 (ref 0.4–1.3)
NUCLEATED RED BLOOD CELLS: 0 /100 WBC
PLATELET # BLD: 355 THOU/MM3 (ref 130–400)
PMV BLD AUTO: 9.1 FL (ref 9.4–12.4)
POTASSIUM SERPL-SCNC: 4.7 MEQ/L (ref 3.5–5.2)
RBC # BLD: 4.79 MILL/MM3 (ref 4.2–5.4)
SEG NEUTROPHILS: 53.5 %
SEGMENTED NEUTROPHILS ABSOLUTE COUNT: 4 THOU/MM3 (ref 1.8–7.7)
SODIUM BLD-SCNC: 142 MEQ/L (ref 135–145)
T4 FREE: 1.04 NG/DL (ref 0.93–1.76)
TOTAL PROTEIN: 7.9 G/DL (ref 6.1–8)
TRIGL SERPL-MCNC: 146 MG/DL (ref 0–199)
TSH SERPL DL<=0.05 MIU/L-ACNC: 2.07 UIU/ML (ref 0.4–4.2)
WBC # BLD: 7.4 THOU/MM3 (ref 4.8–10.8)

## 2021-12-15 PROCEDURE — 82728 ASSAY OF FERRITIN: CPT

## 2021-12-15 PROCEDURE — 84439 ASSAY OF FREE THYROXINE: CPT

## 2021-12-15 PROCEDURE — 84443 ASSAY THYROID STIM HORMONE: CPT

## 2021-12-15 PROCEDURE — 36415 COLL VENOUS BLD VENIPUNCTURE: CPT

## 2021-12-15 PROCEDURE — 83540 ASSAY OF IRON: CPT

## 2021-12-15 PROCEDURE — 80061 LIPID PANEL: CPT

## 2021-12-15 PROCEDURE — 80053 COMPREHEN METABOLIC PANEL: CPT

## 2021-12-15 PROCEDURE — 85025 COMPLETE CBC W/AUTO DIFF WBC: CPT

## 2022-01-12 ENCOUNTER — HOSPITAL ENCOUNTER (OUTPATIENT)
Age: 52
Discharge: HOME | End: 2022-01-12
Payer: COMMERCIAL

## 2022-01-12 LAB
C-REACTIVE PROTEIN: < 0.3 MG/DL (ref 0–1)
RHEUMATOID FACTOR: < 10 IU/ML (ref 0–13)
SEDIMENTATION RATE, ERYTHROCYTE: 13 MM/HR (ref 0–20)
URIC ACID: 4.6 MG/DL (ref 2.4–5.7)

## 2022-01-12 PROCEDURE — 36415 COLL VENOUS BLD VENIPUNCTURE: CPT

## 2022-01-12 PROCEDURE — 86140 C-REACTIVE PROTEIN: CPT

## 2022-01-12 PROCEDURE — 84550 ASSAY OF BLOOD/URIC ACID: CPT

## 2022-01-12 PROCEDURE — 85651 RBC SED RATE NONAUTOMATED: CPT

## 2022-01-12 PROCEDURE — 86430 RHEUMATOID FACTOR TEST QUAL: CPT

## 2022-01-12 PROCEDURE — 86038 ANTINUCLEAR ANTIBODIES: CPT

## 2022-01-15 LAB — ANA SCREEN: NORMAL

## 2022-03-28 ENCOUNTER — HOSPITAL ENCOUNTER (OUTPATIENT)
Dept: CT IMAGING | Age: 52
Discharge: HOME | End: 2022-03-28
Payer: COMMERCIAL

## 2022-03-28 DIAGNOSIS — Z87.442 HISTORY OF KIDNEY STONES: ICD-10-CM

## 2022-03-28 DIAGNOSIS — R10.9 BILATERAL FLANK PAIN: ICD-10-CM

## 2022-03-28 PROCEDURE — 74176 CT ABD & PELVIS W/O CONTRAST: CPT
